# Patient Record
Sex: FEMALE | Race: WHITE | NOT HISPANIC OR LATINO | Employment: FULL TIME | URBAN - METROPOLITAN AREA
[De-identification: names, ages, dates, MRNs, and addresses within clinical notes are randomized per-mention and may not be internally consistent; named-entity substitution may affect disease eponyms.]

---

## 2019-11-26 ENCOUNTER — TELEPHONE (OUTPATIENT)
Dept: ENDOCRINOLOGY | Facility: CLINIC | Age: 54
End: 2019-11-26

## 2019-12-02 ENCOUNTER — TELEPHONE (OUTPATIENT)
Dept: ENDOCRINOLOGY | Facility: CLINIC | Age: 54
End: 2019-12-02

## 2019-12-19 ENCOUNTER — OFFICE VISIT (OUTPATIENT)
Dept: ENDOCRINOLOGY | Facility: CLINIC | Age: 54
End: 2019-12-19
Payer: COMMERCIAL

## 2019-12-19 VITALS
HEART RATE: 75 BPM | SYSTOLIC BLOOD PRESSURE: 128 MMHG | BODY MASS INDEX: 34.06 KG/M2 | HEIGHT: 70 IN | WEIGHT: 237.88 LBS | TEMPERATURE: 98 F | RESPIRATION RATE: 18 BRPM | DIASTOLIC BLOOD PRESSURE: 78 MMHG

## 2019-12-19 DIAGNOSIS — E04.2 MULTIPLE THYROID NODULES: Primary | ICD-10-CM

## 2019-12-19 DIAGNOSIS — E04.1 RIGHT THYROID NODULE: ICD-10-CM

## 2019-12-19 PROCEDURE — 99244 OFF/OP CNSLTJ NEW/EST MOD 40: CPT | Mod: S$PBB,,, | Performed by: INTERNAL MEDICINE

## 2019-12-19 PROCEDURE — 99999 PR PBB SHADOW E&M-EST. PATIENT-LVL IV: ICD-10-PCS | Mod: PBBFAC,,, | Performed by: INTERNAL MEDICINE

## 2019-12-19 PROCEDURE — 99244 PR OFFICE CONSULTATION,LEVEL IV: ICD-10-PCS | Mod: S$PBB,,, | Performed by: INTERNAL MEDICINE

## 2019-12-19 PROCEDURE — 99999 PR PBB SHADOW E&M-EST. PATIENT-LVL IV: CPT | Mod: PBBFAC,,, | Performed by: INTERNAL MEDICINE

## 2019-12-19 RX ORDER — LISINOPRIL 10 MG/1
10 TABLET ORAL DAILY
COMMUNITY
End: 2022-05-25 | Stop reason: ALTCHOICE

## 2019-12-19 RX ORDER — HYDROGEN PEROXIDE 3 %
20 SOLUTION, NON-ORAL MISCELLANEOUS
COMMUNITY

## 2019-12-19 RX ORDER — ALBUTEROL SULFATE 90 UG/1
AEROSOL, METERED RESPIRATORY (INHALATION)
Refills: 5 | COMMUNITY
Start: 2019-10-01 | End: 2019-12-19

## 2019-12-19 RX ORDER — ALBUTEROL SULFATE 90 UG/1
AEROSOL, METERED RESPIRATORY (INHALATION)
COMMUNITY
Start: 2019-10-01

## 2019-12-19 RX ORDER — AMOXICILLIN 875 MG/1
TABLET, FILM COATED ORAL
COMMUNITY
Start: 2019-12-16 | End: 2022-05-25

## 2019-12-19 RX ORDER — TEMAZEPAM 30 MG/1
30 CAPSULE ORAL
COMMUNITY
Start: 2019-11-27

## 2019-12-19 RX ORDER — ALPRAZOLAM 0.5 MG/1
TABLET ORAL
Refills: 2 | COMMUNITY
Start: 2019-09-25

## 2019-12-19 NOTE — H&P (VIEW-ONLY)
""This note will be shared with the patient"Subjective:       Patient ID: Lucia Melo is a 54 y.o. female.  Patient is new to me  Chief Complaint: Establish Care    HPI    Consultation was requested by Dr. Alon Stack    Patient was found to have multiple thyroid nodules on recent ultrasound, 1st discovered mention of a thyroid nodule when she had a CT scan done  Previous ultrasound: Yes - at [facility] November 29, 2019   If yes I directly viewed images of thyroid ultrasound: Yes - and she brought the disc with the images in to the office.  I went over the images with patient and her friend who is here also.  She has multiple thyroid nodules bilaterally of varying sizes and a dominant right sided lower lobe nodule with mostly peripheral calcification that is hypoechoic, slightly irregular border, the next largest size nodule was 1.3 cm and the others are mostly subcentimeter    Increasing in size :  No  Dysphagia: No  Dyspnea on exertion:  No  Orthopnea:  No  Voice changes:  No  Pain:  No  Thyroid cancer in family:  No  History of radiation:  No  Previous FNA: No    I have reviewed the past medical, family and social history  Review of Systems   Constitutional: Negative for appetite change, fatigue, fever and unexpected weight change.   HENT: Negative for sore throat and trouble swallowing.    Eyes: Negative for visual disturbance.   Respiratory: Negative for shortness of breath and wheezing.    Cardiovascular: Negative for chest pain, palpitations and leg swelling.   Gastrointestinal: Negative for diarrhea, nausea and vomiting.   Endocrine: Negative for cold intolerance, heat intolerance, polydipsia, polyphagia and polyuria.   Genitourinary: Negative for difficulty urinating, dysuria and menstrual problem.   Musculoskeletal: Negative for arthralgias and joint swelling.   Skin: Negative for rash.   Neurological: Negative for dizziness, weakness, numbness and headaches.   Psychiatric/Behavioral: Negative for confusion, " dysphoric mood and sleep disturbance.       Objective:      Physical Exam   Constitutional: She appears well-developed and well-nourished. No distress.   HENT:   Head: Normocephalic and atraumatic.   Mouth/Throat: No oropharyngeal exudate.   Eyes: Conjunctivae are normal. No scleral icterus.   Neck: No JVD present. No tracheal deviation present. Thyromegaly present.   Does have a little more fullness on the right compared to the left   Cardiovascular: Normal rate, regular rhythm, normal heart sounds and intact distal pulses. Exam reveals no gallop and no friction rub.   No murmur heard.  Pulmonary/Chest: Effort normal and breath sounds normal. No stridor. No respiratory distress. She has no wheezes. She has no rales. She exhibits no tenderness.   Musculoskeletal: She exhibits no edema or deformity.   Lymphadenopathy:     She has no cervical adenopathy.   Neurological: She is alert.   Skin: Skin is warm and dry. She is not diaphoretic. No erythema.   Vitals reviewed.        Lab Review:  Past TSH, free T3 and free T4  11/25/2019 11/16/2017     0.593 0.984   2.6     0.92      Outside radiology report:  Heterogeneous thyroid with multiple nodules. Recommend ultrasound-guided FNA of the dominant calcified right lower pole nodule.   Result Narrative   US THYROID    CLINICAL INDICATION: E07.9:Disorder of thyroid, unspecified     COMPARISON: None    FINDINGS: Grayscale and color Doppler evaluation of the thyroid gland was performed.     The right thyroid lobe was measured as 6.3 x 2.4 x 1.8 and the left thyroid lobe 6.9 x 2.5 x 1.8 cm. The thyroid isthmus measures 0.6 cm.     Thyroid parenchyma is heterogeneous with multiple bilateral nodules. Vascularity is normal.    On the right, dominant nodule is a 2.3 x 1.8 x 1.1 cm hypoechoic solid nodule with peripheral and internal calcifications. This is characterized as TI-RADS 4. There are several small subcentimeter hypoechoic nodules on the right.    On the left, dominant  nodule is a 1.3 x 1.2 x 0.6 cm hypoechoic nodule. There are several other smaller similar-appearing nodules on the left.   Other Result Information   Interface, Rad Results In - 11/20/2019  2:23 PM CST  US THYROID    CLINICAL INDICATION: E07.9:Disorder of thyroid, unspecified     COMPARISON: None    FINDINGS: Grayscale and color Doppler evaluation of the thyroid gland was performed.     The right thyroid lobe was measured as 6.3 x 2.4 x 1.8 and the left thyroid lobe 6.9 x 2.5 x 1.8 cm. The thyroid isthmus measures 0.6 cm.     Thyroid parenchyma is heterogeneous with multiple bilateral nodules. Vascularity is normal.    On the right, dominant nodule is a 2.3 x 1.8 x 1.1 cm hypoechoic solid nodule with peripheral and internal calcifications. This is characterized as TI-RADS 4. There are several small subcentimeter hypoechoic nodules on the right.    On the left, dominant nodule is a 1.3 x 1.2 x 0.6 cm hypoechoic nodule. There are several other smaller similar-appearing nodules on the left.    IMPRESSION:  Heterogeneous thyroid with multiple nodules. Recommend ultrasound-guided FNA of the dominant calcified right lower pole nodule.   Status Results Details              Assessment:     1. Multiple thyroid nodules     2. Right thyroid nodule  US FNA Thyroid, 1st Lesion            Based on American Thyroid Association Guidelines an FNA is indicated.  I recommend FNA of the right 2.3 cm lower lobe nodule that has internal calcification, went over how procedure is done and answered all questions and also discussed with patient the possibility of needing a repeat FNA he should she have atypical cells of undetermined significance in order to do genetic studies  Plan:   Multiple thyroid nodules    Right thyroid nodule  -     US FNA Thyroid, 1st Lesion; Future; Expected date: 12/19/2019           Thank you to Dr. Alon Stack for this consultation

## 2019-12-19 NOTE — PROGRESS NOTES
""This note will be shared with the patient"Subjective:       Patient ID: Lucia Melo is a 54 y.o. female.  Patient is new to me  Chief Complaint: Establish Care    HPI    Consultation was requested by Dr. Alon Stack    Patient was found to have multiple thyroid nodules on recent ultrasound, 1st discovered mention of a thyroid nodule when she had a CT scan done  Previous ultrasound: Yes - at [facility] November 29, 2019   If yes I directly viewed images of thyroid ultrasound: Yes - and she brought the disc with the images in to the office.  I went over the images with patient and her friend who is here also.  She has multiple thyroid nodules bilaterally of varying sizes and a dominant right sided lower lobe nodule with mostly peripheral calcification that is hypoechoic, slightly irregular border, the next largest size nodule was 1.3 cm and the others are mostly subcentimeter    Increasing in size :  No  Dysphagia: No  Dyspnea on exertion:  No  Orthopnea:  No  Voice changes:  No  Pain:  No  Thyroid cancer in family:  No  History of radiation:  No  Previous FNA: No    I have reviewed the past medical, family and social history  Review of Systems   Constitutional: Negative for appetite change, fatigue, fever and unexpected weight change.   HENT: Negative for sore throat and trouble swallowing.    Eyes: Negative for visual disturbance.   Respiratory: Negative for shortness of breath and wheezing.    Cardiovascular: Negative for chest pain, palpitations and leg swelling.   Gastrointestinal: Negative for diarrhea, nausea and vomiting.   Endocrine: Negative for cold intolerance, heat intolerance, polydipsia, polyphagia and polyuria.   Genitourinary: Negative for difficulty urinating, dysuria and menstrual problem.   Musculoskeletal: Negative for arthralgias and joint swelling.   Skin: Negative for rash.   Neurological: Negative for dizziness, weakness, numbness and headaches.   Psychiatric/Behavioral: Negative for confusion, " dysphoric mood and sleep disturbance.       Objective:      Physical Exam   Constitutional: She appears well-developed and well-nourished. No distress.   HENT:   Head: Normocephalic and atraumatic.   Mouth/Throat: No oropharyngeal exudate.   Eyes: Conjunctivae are normal. No scleral icterus.   Neck: No JVD present. No tracheal deviation present. Thyromegaly present.   Does have a little more fullness on the right compared to the left   Cardiovascular: Normal rate, regular rhythm, normal heart sounds and intact distal pulses. Exam reveals no gallop and no friction rub.   No murmur heard.  Pulmonary/Chest: Effort normal and breath sounds normal. No stridor. No respiratory distress. She has no wheezes. She has no rales. She exhibits no tenderness.   Musculoskeletal: She exhibits no edema or deformity.   Lymphadenopathy:     She has no cervical adenopathy.   Neurological: She is alert.   Skin: Skin is warm and dry. She is not diaphoretic. No erythema.   Vitals reviewed.        Lab Review:  Past TSH, free T3 and free T4  11/25/2019 11/16/2017     0.593 0.984   2.6     0.92      Outside radiology report:  Heterogeneous thyroid with multiple nodules. Recommend ultrasound-guided FNA of the dominant calcified right lower pole nodule.   Result Narrative   US THYROID    CLINICAL INDICATION: E07.9:Disorder of thyroid, unspecified     COMPARISON: None    FINDINGS: Grayscale and color Doppler evaluation of the thyroid gland was performed.     The right thyroid lobe was measured as 6.3 x 2.4 x 1.8 and the left thyroid lobe 6.9 x 2.5 x 1.8 cm. The thyroid isthmus measures 0.6 cm.     Thyroid parenchyma is heterogeneous with multiple bilateral nodules. Vascularity is normal.    On the right, dominant nodule is a 2.3 x 1.8 x 1.1 cm hypoechoic solid nodule with peripheral and internal calcifications. This is characterized as TI-RADS 4. There are several small subcentimeter hypoechoic nodules on the right.    On the left, dominant  nodule is a 1.3 x 1.2 x 0.6 cm hypoechoic nodule. There are several other smaller similar-appearing nodules on the left.   Other Result Information   Interface, Rad Results In - 11/20/2019  2:23 PM CST  US THYROID    CLINICAL INDICATION: E07.9:Disorder of thyroid, unspecified     COMPARISON: None    FINDINGS: Grayscale and color Doppler evaluation of the thyroid gland was performed.     The right thyroid lobe was measured as 6.3 x 2.4 x 1.8 and the left thyroid lobe 6.9 x 2.5 x 1.8 cm. The thyroid isthmus measures 0.6 cm.     Thyroid parenchyma is heterogeneous with multiple bilateral nodules. Vascularity is normal.    On the right, dominant nodule is a 2.3 x 1.8 x 1.1 cm hypoechoic solid nodule with peripheral and internal calcifications. This is characterized as TI-RADS 4. There are several small subcentimeter hypoechoic nodules on the right.    On the left, dominant nodule is a 1.3 x 1.2 x 0.6 cm hypoechoic nodule. There are several other smaller similar-appearing nodules on the left.    IMPRESSION:  Heterogeneous thyroid with multiple nodules. Recommend ultrasound-guided FNA of the dominant calcified right lower pole nodule.   Status Results Details              Assessment:     1. Multiple thyroid nodules     2. Right thyroid nodule  US FNA Thyroid, 1st Lesion            Based on American Thyroid Association Guidelines an FNA is indicated.  I recommend FNA of the right 2.3 cm lower lobe nodule that has internal calcification, went over how procedure is done and answered all questions and also discussed with patient the possibility of needing a repeat FNA he should she have atypical cells of undetermined significance in order to do genetic studies  Plan:   Multiple thyroid nodules    Right thyroid nodule  -     US FNA Thyroid, 1st Lesion; Future; Expected date: 12/19/2019           Thank you to Dr. Alon Stack for this consultation

## 2019-12-19 NOTE — LETTER
December 19, 2019      lAon Stack MD  [address] [facility] [address]           [facility]  [address]  [place] [address]  Phone: [phone]  Fax: [phone]          Patient: Lucia Melo   MR Number: [id number]   YOB: 1965   Date of Visit: 12/19/2019       Dear Dr. Alon ZARATE Sreekanth:    Thank you for referring Lucia Melo to me for evaluation. Attached you will find relevant portions of my assessment and plan of care.    If you have questions, please do not hesitate to call me. I look forward to following Lucia Melo along with you.    Sincerely,    Silvia Cabrera MD    Enclosure  CC:  No Recipients    If you would like to receive this communication electronically, please contact [email or web address] or [phone] to request more information on [facility] access.    For providers and/or their staff who would like to refer a patient to [facility], please contact us through our one-stop-shop provider referral line, [facility], at [phone].    If you feel you have received this communication in error or would no longer like to receive these types of communications, please e-mail [email or web address]

## 2019-12-27 ENCOUNTER — HOSPITAL ENCOUNTER (OUTPATIENT)
Dept: RADIOLOGY | Facility: HOSPITAL | Age: 54
Discharge: HOME | End: 2019-12-27
Attending: INTERNAL MEDICINE
Payer: COMMERCIAL

## 2019-12-27 DIAGNOSIS — E04.1 RIGHT THYROID NODULE: ICD-10-CM

## 2019-12-27 PROCEDURE — 88172 CYTP DX EVAL FNA 1ST EA SITE: CPT | Mod: 26,,, | Performed by: PATHOLOGY

## 2019-12-27 PROCEDURE — 88173 PR  INTERPRETATION OF FNA SMEAR: ICD-10-PCS | Mod: 26,,, | Performed by: PATHOLOGY

## 2019-12-27 PROCEDURE — 88177 CYTP FNA EVAL EA ADDL: CPT | Performed by: PATHOLOGY

## 2019-12-27 PROCEDURE — 88173 CYTOPATH EVAL FNA REPORT: CPT | Mod: 26,,, | Performed by: PATHOLOGY

## 2019-12-27 PROCEDURE — 88172 PR  EVALUATION OF FNA SMEAR TO DETERMINE ADEQUACY, FIRST EVAL: ICD-10-PCS | Mod: 26,,, | Performed by: PATHOLOGY

## 2019-12-27 PROCEDURE — 10005 FNA BX W/US GDN 1ST LES: CPT

## 2019-12-27 PROCEDURE — 88173 CYTOPATH EVAL FNA REPORT: CPT | Performed by: PATHOLOGY

## 2019-12-27 PROCEDURE — 88172 CYTP DX EVAL FNA 1ST EA SITE: CPT | Performed by: PATHOLOGY

## 2019-12-27 NOTE — DISCHARGE SUMMARY
Pre Op Diagnosis: right thyroid nodule     Post Op Diagnosis: same     Procedure:  Thyroid fna     Procedure performed by: Shivani RUSSELL, Ariana WISE     Written Informed Consent Obtained: Yes     Specimen Removed:  yes     Estimated Blood Loss:  minimal     Findings: Local anesthesia and moderate sedation were used.     The patient tolerated the procedure well and there were no complications.      Sterile technique was performed in the right neck, lidocaine was used as a local anesthetic.  Multiple samples taken from the right thyroid nodule.  Pt tolerated the procedure well without immediate complications.  Please see radiologist report for details. F/u with PCP and/or ordering physician.

## 2020-01-03 LAB — FINAL PATHOLOGIC DIAGNOSIS: ABNORMAL

## 2022-05-25 PROBLEM — J30.9 ALLERGIC RHINITIS: Status: ACTIVE | Noted: 2018-04-30

## 2022-05-25 PROBLEM — I10 ESSENTIAL HYPERTENSION: Status: ACTIVE | Noted: 2017-02-16

## 2022-05-25 PROBLEM — K21.9 GASTROESOPHAGEAL REFLUX DISEASE WITHOUT ESOPHAGITIS: Status: ACTIVE | Noted: 2017-02-16

## 2022-05-25 PROBLEM — T81.89XA STITCH GRANULOMA, INITIAL ENCOUNTER: Status: ACTIVE | Noted: 2022-05-25

## 2022-05-25 PROBLEM — G47.62 NOCTURNAL LEG CRAMPS: Status: ACTIVE | Noted: 2019-05-06

## 2022-05-25 PROBLEM — G25.81 RESTLESS LEG SYNDROME: Status: ACTIVE | Noted: 2017-08-31

## 2022-05-25 PROBLEM — J44.9 COPD (CHRONIC OBSTRUCTIVE PULMONARY DISEASE): Status: ACTIVE | Noted: 2017-08-31

## 2024-01-11 NOTE — TELEPHONE ENCOUNTER
----- Message from Meghana Huitron sent at 11/26/2019  3:34 PM CST -----  Regarding: External referral  Good afternoon,    Patient being referred to Endocrinology for thyroid mass. Patient requested Dr. Cabrera or Dr. Browne if possible. I offered first available in January and patient would like to be soon if possible. I scanned the referral and records into  for review. Please advise.     Thank you!  Meghana  
Appointment schedule and patient was added to the waitlist   
11-Jan-2024